# Patient Record
Sex: MALE | Race: WHITE | NOT HISPANIC OR LATINO | Employment: OTHER | URBAN - METROPOLITAN AREA
[De-identification: names, ages, dates, MRNs, and addresses within clinical notes are randomized per-mention and may not be internally consistent; named-entity substitution may affect disease eponyms.]

---

## 2019-07-17 ENCOUNTER — TRANSCRIBE ORDERS (OUTPATIENT)
Dept: ADMINISTRATIVE | Facility: HOSPITAL | Age: 59
End: 2019-07-17

## 2019-07-17 DIAGNOSIS — E04.1 THYROID NODULE: Primary | ICD-10-CM

## 2019-07-22 ENCOUNTER — HOSPITAL ENCOUNTER (OUTPATIENT)
Dept: ULTRASOUND IMAGING | Facility: HOSPITAL | Age: 59
Discharge: HOME | End: 2019-07-22

## 2019-07-22 ENCOUNTER — HOSPITAL ENCOUNTER (OUTPATIENT)
Dept: ULTRASOUND IMAGING | Facility: HOSPITAL | Age: 59
Discharge: HOME | End: 2019-07-22
Admitting: INTERNAL MEDICINE

## 2019-07-22 DIAGNOSIS — E04.1 THYROID NODULE: ICD-10-CM

## 2019-07-22 PROCEDURE — 76536 US EXAM OF HEAD AND NECK: CPT

## 2021-11-01 PROBLEM — E05.90 HYPERTHYROIDISM: Status: ACTIVE | Noted: 2021-11-01

## 2021-11-01 NOTE — ASSESSMENT & PLAN NOTE
-Discussed in length and detail the causes of hyperthyroidism.    -Continue taking Methimazole 10 mg BID. Cautioned regarding potential side effects of methimazole including rash, agranulocytosis, and LFT abnormality.  Patient advised to call the office with any concerns.  -Will obtain further labs today.  -Will order Uptake Scan.  Patient instructed to stop methimazole at least 14 days prior to having the scan.    -Will follow-up in 2 months, possibly sooner if results of scan and labs are available for treatment plan.

## 2021-11-01 NOTE — PROGRESS NOTES
Chief Complaint   Patient presents with   • Hyperthyroidism     Initial encounter        Referring Provider  Graham Major DO HPI   Deangelo Alejo is a 61 y.o. male had concerns including Hyperthyroidism (Initial encounter).   Seen as a new patient today.  He is currently taking Methimazole 10 mg BID.  He had the following labs on 9/2/2021:  TSH: <0.005  Free T4: 3.31  Free T3: 7.53  He was having diarrhea, nausea/vomiting, sweating, weight loss, tremor, palpitations.  All of his symptoms have improved since starting taking Methimazole.  He had an Thyroid US on 9/2/2021, which showed a 6mm nodule in the right lobe.    Birth state:[place]  Previous history of radiation to face/neck: none  Consuming foods high in iodine: none  Family history of thyroid complications: grandma-goiter.    Past Medical History:   Diagnosis Date   • Hyperthyroidism      History reviewed. No pertinent surgical history.   History reviewed. No pertinent family history.   Social History     Socioeconomic History   • Marital status:    Tobacco Use   • Smoking status: Current Every Day Smoker     Packs/day: 1.50     Years: 43.00     Pack years: 64.50   Vaping Use   • Vaping Use: Never used   Substance and Sexual Activity   • Alcohol use: Never   • Drug use: Never   • Sexual activity: Defer      No Known Allergies   Current Outpatient Medications on File Prior to Visit   Medication Sig Dispense Refill   • Aspirin Low Dose 81 MG EC tablet Take 81 mg by mouth Daily.     • citalopram (CeleXA) 20 MG tablet Take 20 mg by mouth Daily.     • famotidine (PEPCID) 20 MG tablet Take 20 mg by mouth 2 (Two) Times a Day As Needed.     • glipizide (GLUCOTROL) 10 MG tablet Take 10 mg by mouth 2 (Two) Times a Day.     • hydrOXYzine (ATARAX) 25 MG tablet TAKE 1 TO 2 TABLETS BY MOUTH AT BEDTIME IF NEEDED     •  MG tablet TAKE ONE TABLET BY MOUTH THREE TIMES A DAY IF NEEDED     • lisinopril (PRINIVIL,ZESTRIL) 5 MG tablet Take 5 mg by mouth Daily.     •  "metFORMIN (GLUCOPHAGE) 500 MG tablet Take 500 mg by mouth 2 (Two) Times a Day.     • methIMAzole (TAPAZOLE) 10 MG tablet 2 (Two) Times a Day.     • OLANZapine (zyPREXA) 10 MG tablet Take 10 mg by mouth Every Night.     • OLANZapine (zyPREXA) 5 MG tablet Take 5 mg by mouth Every Morning.     • pantoprazole (PROTONIX) 20 MG EC tablet Take 20 mg by mouth Daily.       No current facility-administered medications on file prior to visit.      Review of Systems   Constitutional: Positive for diaphoresis, unexpected weight gain and unexpected weight loss.   Respiratory: Positive for apnea, cough and shortness of breath.    Endocrine: Positive for polydipsia and polyuria.   Musculoskeletal: Positive for arthralgias, back pain, gait problem and myalgias.   All other systems reviewed and are negative.       BP 95/62 (BP Location: Left arm, Patient Position: Sitting, Cuff Size: Adult)   Pulse 71   Temp 97 °F (36.1 °C) (Oral)   Ht 172.7 cm (68\")   Wt 73.5 kg (162 lb)   SpO2 92%   BMI 24.63 kg/m²      Physical Exam  Vitals reviewed.   Constitutional:       Appearance: Normal appearance.   Eyes:      Extraocular Movements: Extraocular movements intact.   Neck:      Thyroid: No thyroid tenderness.      Comments: Mild thyromegaly noted.  Cardiovascular:      Rate and Rhythm: Normal rate and regular rhythm.      Pulses: Normal pulses.      Heart sounds: Normal heart sounds.   Pulmonary:      Effort: Pulmonary effort is normal.      Breath sounds: Normal breath sounds.   Skin:     General: Skin is warm.   Neurological:      Mental Status: He is alert and oriented to person, place, and time.   Psychiatric:         Mood and Affect: Mood normal.         Behavior: Behavior normal.         Thought Content: Thought content normal.         Judgment: Judgment normal.         Assessment and Plan    Diagnoses and all orders for this visit:    1. Hyperthyroidism (Primary)  Assessment & Plan:  -Discussed in length and detail the causes of " hyperthyroidism.    -Continue taking Methimazole 10 mg BID. Cautioned regarding potential side effects of methimazole including rash, agranulocytosis, and LFT abnormality.  Patient advised to call the office with any concerns.  -Will obtain further labs today.  -Will order Uptake Scan.  Patient instructed to stop methimazole at least 14 days prior to having the scan.    -Will follow-up in 2 months, possibly sooner if results of scan and labs are available for treatment plan.      Orders:  -     Thyroid Stimulating Immunoglobulin  -     NM Thyroid Uptake & Scan; Future       Return in about 2 months (around 1/2/2022). The patient was instructed to contact the clinic with any interval questions or concerns.    PANDA Hardy

## 2021-11-02 ENCOUNTER — OFFICE VISIT (OUTPATIENT)
Dept: ENDOCRINOLOGY | Facility: CLINIC | Age: 61
End: 2021-11-02

## 2021-11-02 ENCOUNTER — LAB (OUTPATIENT)
Dept: LAB | Facility: HOSPITAL | Age: 61
End: 2021-11-02

## 2021-11-02 VITALS
HEART RATE: 71 BPM | TEMPERATURE: 97 F | HEIGHT: 68 IN | OXYGEN SATURATION: 92 % | SYSTOLIC BLOOD PRESSURE: 95 MMHG | WEIGHT: 162 LBS | DIASTOLIC BLOOD PRESSURE: 62 MMHG | BODY MASS INDEX: 24.55 KG/M2

## 2021-11-02 DIAGNOSIS — E05.90 HYPERTHYROIDISM: Primary | ICD-10-CM

## 2021-11-02 PROCEDURE — 36415 COLL VENOUS BLD VENIPUNCTURE: CPT | Performed by: NURSE PRACTITIONER

## 2021-11-02 PROCEDURE — 99203 OFFICE O/P NEW LOW 30 MIN: CPT | Performed by: NURSE PRACTITIONER

## 2021-11-02 PROCEDURE — 84445 ASSAY OF TSI GLOBULIN: CPT | Performed by: NURSE PRACTITIONER

## 2021-11-02 RX ORDER — ASPIRIN 81 MG/1
81 TABLET, COATED ORAL DAILY
COMMUNITY
Start: 2021-10-28

## 2021-11-02 RX ORDER — GLIPIZIDE 10 MG/1
10 TABLET ORAL 2 TIMES DAILY
COMMUNITY
Start: 2021-10-28

## 2021-11-02 RX ORDER — OLANZAPINE 10 MG/1
10 TABLET ORAL NIGHTLY
COMMUNITY
Start: 2021-10-28

## 2021-11-02 RX ORDER — HYDROXYZINE HYDROCHLORIDE 25 MG/1
TABLET, FILM COATED ORAL
COMMUNITY
Start: 2021-10-28

## 2021-11-02 RX ORDER — OLANZAPINE 5 MG/1
5 TABLET ORAL EVERY MORNING
COMMUNITY
Start: 2021-10-28

## 2021-11-02 RX ORDER — CITALOPRAM 20 MG/1
20 TABLET ORAL DAILY
COMMUNITY
Start: 2021-10-28

## 2021-11-02 RX ORDER — LISINOPRIL 5 MG/1
5 TABLET ORAL DAILY
COMMUNITY
Start: 2021-10-28

## 2021-11-02 RX ORDER — METHIMAZOLE 10 MG/1
TABLET ORAL 2 TIMES DAILY
COMMUNITY
Start: 2021-10-28

## 2021-11-02 RX ORDER — PANTOPRAZOLE SODIUM 20 MG/1
20 TABLET, DELAYED RELEASE ORAL DAILY
COMMUNITY
Start: 2021-10-28

## 2021-11-02 RX ORDER — IBUPROFEN 600 MG/1
TABLET ORAL
COMMUNITY
Start: 2021-10-13

## 2021-11-02 RX ORDER — FAMOTIDINE 20 MG/1
20 TABLET, FILM COATED ORAL 2 TIMES DAILY PRN
COMMUNITY
Start: 2021-10-13

## 2021-11-04 ENCOUNTER — DOCUMENTATION (OUTPATIENT)
Dept: ENDOCRINOLOGY | Facility: CLINIC | Age: 61
End: 2021-11-04

## 2021-11-04 LAB — TSI SER-ACNC: 2.27 IU/L (ref 0–0.55)
